# Patient Record
Sex: MALE | Race: OTHER | HISPANIC OR LATINO | Employment: STUDENT | URBAN - METROPOLITAN AREA
[De-identification: names, ages, dates, MRNs, and addresses within clinical notes are randomized per-mention and may not be internally consistent; named-entity substitution may affect disease eponyms.]

---

## 2023-06-21 ENCOUNTER — TELEPHONE (OUTPATIENT)
Dept: BARIATRICS | Facility: CLINIC | Age: 28
End: 2023-06-21

## 2024-08-23 ENCOUNTER — OFFICE VISIT (OUTPATIENT)
Dept: FAMILY MEDICINE CLINIC | Facility: CLINIC | Age: 29
End: 2024-08-23
Payer: COMMERCIAL

## 2024-08-23 ENCOUNTER — TELEPHONE (OUTPATIENT)
Dept: FAMILY MEDICINE CLINIC | Facility: CLINIC | Age: 29
End: 2024-08-23

## 2024-08-23 VITALS
HEART RATE: 88 BPM | DIASTOLIC BLOOD PRESSURE: 88 MMHG | WEIGHT: 256 LBS | OXYGEN SATURATION: 98 % | SYSTOLIC BLOOD PRESSURE: 126 MMHG | TEMPERATURE: 97.8 F | BODY MASS INDEX: 35.84 KG/M2 | RESPIRATION RATE: 18 BRPM | HEIGHT: 71 IN

## 2024-08-23 DIAGNOSIS — Z00.00 ROUTINE ADULT HEALTH MAINTENANCE: ICD-10-CM

## 2024-08-23 DIAGNOSIS — R73.01 IFG (IMPAIRED FASTING GLUCOSE): ICD-10-CM

## 2024-08-23 DIAGNOSIS — E66.01 SEVERE OBESITY (BMI 35.0-39.9) WITH COMORBIDITY (HCC): Primary | ICD-10-CM

## 2024-08-23 PROCEDURE — 99395 PREV VISIT EST AGE 18-39: CPT | Performed by: FAMILY MEDICINE

## 2024-08-23 PROCEDURE — 99203 OFFICE O/P NEW LOW 30 MIN: CPT | Performed by: FAMILY MEDICINE

## 2024-08-23 NOTE — TELEPHONE ENCOUNTER
Patient states at his visit he was informed to call his insurance to see what medication they will cover. He states that he called his insurance and they will cover Wegovy.

## 2024-08-26 ENCOUNTER — TELEPHONE (OUTPATIENT)
Dept: FAMILY MEDICINE CLINIC | Facility: CLINIC | Age: 29
End: 2024-08-26

## 2024-08-26 NOTE — TELEPHONE ENCOUNTER
Pt wife wants to know if think the Skin Tag removal that is scheduled for 08/28/24 will be medically necessary under these guidelines.     Biopsy suggests or is indicative of pre-malignancy (e.g., dysplasia) or malignancy; or  Due to its anatomic location, the lesion has been subject to recurrent trauma/irritation (eg, bra line, waist band, etc.); or  Lesion appears to be pre-malignant (e.g., actinic keratoses (see CPB 0567 - Actinic Keratoses Treatment), Bowen's disease, dysplastic lesions, dysplastic nevus syndrome, large congenital melanocytic nevi, lentigo maligna, or leukoplakia) or malignantFootnote1* (due to coloration, change in appearance or size, etc. (see note below) especially in a person with personal or family history of melanoma); or  Skin lesions are causing symptoms (e.g., bleeding, burning, intense itching, or irritation); or  The lesion has evidence of inflammation (e.g., edema, erythema, or purulence); or  The lesion is infectious (e.g., warts (verruca vulgaris)); or  The lesion restricts vision or obstructs a body orifice.    The spouse of the patient was advise if it doesn't meet the critera it is cosmetic. But we also advise coding would be done once pathology is back from the specimen.     Please advise if think it meets the guidelines of medically necessary.

## 2024-08-26 NOTE — PROGRESS NOTES
Assessment/Plan:    28 y/o male with: severe obesity with comorbidity and IFG along with annual well visit. Will check labs. Discussed supportive care and return parameters. Encourage healthy lifestyle changes.    Regarding Annual well visit. Discussed various safety and health maintenance issues including healthy diet like the Mediterranean diet, exercise, ample sleep, stress reduction, and healthy weight as tolerated. Discussed supportive care and return parameters.     No problem-specific Assessment & Plan notes found for this encounter.       Diagnoses and all orders for this visit:    Severe obesity (BMI 35.0-39.9) with comorbidity (HCC)  -     CBC and differential; Future  -     Comprehensive metabolic panel; Future  -     TSH, 3rd generation with Free T4 reflex; Future  -     Lipid Panel with Direct LDL reflex; Future  -     Hemoglobin A1C; Future    Routine adult health maintenance  -     CBC and differential; Future  -     Comprehensive metabolic panel; Future  -     TSH, 3rd generation with Free T4 reflex; Future  -     Lipid Panel with Direct LDL reflex; Future  -     Hemoglobin A1C; Future    IFG (impaired fasting glucose)  -     CBC and differential; Future  -     Comprehensive metabolic panel; Future  -     TSH, 3rd generation with Free T4 reflex; Future  -     Lipid Panel with Direct LDL reflex; Future  -     Hemoglobin A1C; Future          Subjective:     Chief Complaint   Patient presents with    Establish Care     New patient is here to establish care. Pt is experiencing thickening of the left great toe nail. Pt complaining of a bump on the back of head. Would like to discuss weight gain. No other concerns.   LR    Nail Problem    Mass    Weight Gain        Patient ID: Jason Arriaza is a 29 y.o. male.    Patient is a 28 y/o male who presents to establish care in this practice. Pt has severe obesity with comorbidity and IFG. No fevers chills nausea or vomiting. Pt also here for annual well visit admits  "being active eats and sleeps well.        The following portions of the patient's history were reviewed and updated as appropriate: allergies, current medications, past family history, past medical history, past social history, past surgical history and problem list.    Review of Systems   Constitutional: Negative.    HENT: Negative.     Eyes: Negative.    Respiratory: Negative.     Cardiovascular: Negative.    Gastrointestinal: Negative.    Endocrine: Negative.    Genitourinary: Negative.    Musculoskeletal: Negative.    Allergic/Immunologic: Negative.    Neurological: Negative.    Hematological: Negative.    Psychiatric/Behavioral: Negative.     All other systems reviewed and are negative.        Objective:      /88 (BP Location: Left arm, Patient Position: Sitting, Cuff Size: Adult)   Pulse 88   Temp 97.8 °F (36.6 °C) (Tympanic)   Resp 18   Ht 5' 11\" (1.803 m)   Wt 116 kg (256 lb)   SpO2 98%   BMI 35.70 kg/m²          Physical Exam  Constitutional:       Appearance: He is well-developed.   HENT:      Head: Atraumatic.      Right Ear: External ear normal.      Left Ear: External ear normal.   Eyes:      Extraocular Movements: EOM normal.      Conjunctiva/sclera: Conjunctivae normal.      Pupils: Pupils are equal, round, and reactive to light.   Cardiovascular:      Rate and Rhythm: Normal rate and regular rhythm.      Heart sounds: Normal heart sounds.   Pulmonary:      Effort: Pulmonary effort is normal. No respiratory distress.      Breath sounds: Normal breath sounds.   Abdominal:      General: There is no distension.      Palpations: Abdomen is soft.      Tenderness: There is no abdominal tenderness. There is no guarding or rebound.   Musculoskeletal:         General: Normal range of motion.      Cervical back: Normal range of motion.   Skin:     General: Skin is warm and dry.   Neurological:      Mental Status: He is alert and oriented to person, place, and time.      Cranial Nerves: No cranial " nerve deficit.   Psychiatric:         Mood and Affect: Mood and affect normal.         Behavior: Behavior normal.         Thought Content: Thought content normal.         Judgment: Judgment normal.

## 2024-08-27 NOTE — TELEPHONE ENCOUNTER
We cannot know if insurance will pay for it, she is welcome to check with them, worse come to worse she would pay cash for it

## 2024-08-27 NOTE — TELEPHONE ENCOUNTER
Left message for patient spouse to advise of  response. I advise to call office if they have any further questions or concerns.

## 2024-09-03 ENCOUNTER — TELEPHONE (OUTPATIENT)
Age: 29
End: 2024-09-03

## 2024-09-03 NOTE — TELEPHONE ENCOUNTER
Dano states all the local [facility]  the only pharmacy insurance will cover so send the script there to fill when it is not backordered.

## 2024-09-22 PROBLEM — Z00.00 ROUTINE ADULT HEALTH MAINTENANCE: Status: RESOLVED | Noted: 2024-08-23 | Resolved: 2024-09-22

## 2025-01-02 ENCOUNTER — TELEPHONE (OUTPATIENT)
Age: 30
End: 2025-01-02

## 2025-01-02 NOTE — TELEPHONE ENCOUNTER
PA for Wegovy 0.25MG/0.5ML SUBMITTED to [facility]    via    []CMM-KEY:   [x]Surescripts-Case ID # [id number]   []Availity-Auth ID # NDC #   []Faxed to plan   []Other website   []Phone call Case ID #     [x]PA sent as URGENT    All office notes, labs and other pertaining documents and studies sent. Clinical questions answered. Awaiting determination from insurance company.     Turnaround time for your insurance to make a decision on your Prior Authorization can take 7-21 business days.

## 2025-01-02 NOTE — TELEPHONE ENCOUNTER
Reason for call:   [x] Prior Auth  [] Other:     Caller:  [x] Patient  [] Pharmacy  Name:   Address:   Callback Number:     Medication:   Semaglutide-Weight Management (Wegovy) 0.25 MG/0.5ML     Dose/Frequency:  Inject 0.25 mg under the skin weekly     Quantity: 2 ml    Ordering Provider:   [] PCP/Provider -   [] Speciality/Provider -     Has the patient tried other medications and failed? If failed, which medications did they fail?    [] No   [] Yes -     Is the patient's insurance updated in EPIC?   [x] Yes   [] No     Is a copy of the patient's insurance scanned in EPIC?   [x] Yes   [] No

## 2025-01-02 NOTE — TELEPHONE ENCOUNTER
PA for Wegovy 0.25MG/0.5ML APPROVED     Date(s) approved: 01/02/2025-08/02/2025    Case #[id number]     Patient advised by          [x]MyChart Message  []Phone call   []LMOM  []L/M to call office as no active Communication consent on file  [x]Unable to leave detailed message as VM not approved on Communication consent       Pharmacy advised by    [x]Fax  []Phone call    Approval letter scanned into Media Yes

## 2025-01-14 DIAGNOSIS — E66.01 SEVERE OBESITY (BMI 35.0-39.9) WITH COMORBIDITY (HCC): ICD-10-CM

## 2025-01-14 DIAGNOSIS — R73.01 IFG (IMPAIRED FASTING GLUCOSE): ICD-10-CM

## 2025-01-14 RX ORDER — SEMAGLUTIDE 0.25 MG/.5ML
INJECTION, SOLUTION SUBCUTANEOUS
Qty: 2 ML | Refills: 0 | OUTPATIENT
Start: 2025-01-14

## 2025-01-14 NOTE — TELEPHONE ENCOUNTER
Pt wife, Tonya called.  Her  is doing well on the medication, Wegov 0.25mg. Is ready to increase to the next dose.  Please send message via MY CHART once medication is sent to to [facility].    Medication:     Semaglutide-Weight Management (Wegovy)       Dose/Frequency: ?    Quantity: ?    Pharmacy: [facility] pharmacy, [address] [place] [address]    Office:   [x] PCP/Provider - Giovany Judge MD   [] Speciality/Provider -     Does the patient have enough for 3 days?   [] Yes   [x] No - Send as HP to POD

## 2025-01-15 DIAGNOSIS — E66.01 SEVERE OBESITY (BMI 35.0-39.9) WITH COMORBIDITY (HCC): Primary | ICD-10-CM

## 2025-01-15 DIAGNOSIS — R73.01 IFG (IMPAIRED FASTING GLUCOSE): ICD-10-CM

## 2025-01-15 DIAGNOSIS — E66.01 SEVERE OBESITY (BMI 35.0-39.9) WITH COMORBIDITY (HCC): ICD-10-CM

## 2025-01-15 RX ORDER — SEMAGLUTIDE 0.5 MG/.5ML
INJECTION, SOLUTION SUBCUTANEOUS
Qty: 2 ML | Refills: 0 | Status: SHIPPED | OUTPATIENT
Start: 2025-01-15

## 2025-01-15 RX ORDER — SEMAGLUTIDE 0.25 MG/.5ML
INJECTION, SOLUTION SUBCUTANEOUS
Qty: 2 ML | Refills: 0 | Status: CANCELLED | OUTPATIENT
Start: 2025-01-15

## 2025-01-15 NOTE — TELEPHONE ENCOUNTER
Pt wife called in stating pharmacy has not received the new prescription for Wegovy yet requested to resend and keep her informed by an return call so she can get the medication today. Kindly help. Thanks

## 2025-01-15 NOTE — TELEPHONE ENCOUNTER
Pts wife called again today for the wegovy prescription,called office and Jillian said she will take care of it. fyi

## 2025-02-07 ENCOUNTER — TELEPHONE (OUTPATIENT)
Age: 30
End: 2025-02-07

## 2025-02-07 DIAGNOSIS — E66.01 SEVERE OBESITY (BMI 35.0-39.9) WITH COMORBIDITY (HCC): Primary | ICD-10-CM

## 2025-02-07 DIAGNOSIS — R73.01 IFG (IMPAIRED FASTING GLUCOSE): ICD-10-CM

## 2025-02-07 RX ORDER — SEMAGLUTIDE 1 MG/.5ML
INJECTION, SOLUTION SUBCUTANEOUS
Qty: 2 ML | Refills: 0 | Status: SHIPPED | OUTPATIENT
Start: 2025-02-07

## 2025-02-07 NOTE — TELEPHONE ENCOUNTER
Please review and advise or send next step to [facility] [place] [address]

## 2025-02-07 NOTE — TELEPHONE ENCOUNTER
Colds are due to viral infections and are very common. Sore throat is a prominent, and often the first, symptom. The cough that accompanies most colds is annoying but helps physiologically to protect the lungs and clear them of secretions.  Antibiotics are Patient spouse called to report that patient is doing really well on Wegovy. He has had no issues and is ready to go to next dosage of 1 MG.    Medication: Semaglutide-Weight Management (Wegovy)     Dose/Frequency: ?    Quantity: ?    Pharmacy: [id number] - [place], [place] - [address]     Office:   [x] PCP/Provider - Giovany Judge MD   [] Speciality/Provider -     Does the patient have enough for 3 days?   [x] Yes   [] No - Send as HP to POD     present. · Steamy showers before bed may help lessen the cough reflex  · Honey has been shown to be the most helpful cough suppressant - better than OTC cough medications like Delsym.  OTC cough medications can contain many different ingredients and are be

## 2025-03-18 DIAGNOSIS — E66.01 SEVERE OBESITY (BMI 35.0-39.9) WITH COMORBIDITY (HCC): ICD-10-CM

## 2025-03-18 DIAGNOSIS — R73.01 IFG (IMPAIRED FASTING GLUCOSE): ICD-10-CM

## 2025-03-18 RX ORDER — SEMAGLUTIDE 1 MG/.5ML
INJECTION, SOLUTION SUBCUTANEOUS
Qty: 2 ML | Refills: 0 | Status: SHIPPED | OUTPATIENT
Start: 2025-03-18

## 2025-03-18 NOTE — TELEPHONE ENCOUNTER
How are you tolerating the medication?   [] Nausea  [] Vomiting  [] Diarrhea  [x] Asymptomatic  [] Other:    Last visit weight: 256 lbs    Current weight: 245 lbs     Date of last injection: 03/15/25    How many injections do you have left: 1  Current dose Semaglutide-Weight Management (Wegovy) 1 MG/0.5ML  Patient would like to to stay on the current dose   [id number] - [place], [place] - [address]

## 2025-04-14 ENCOUNTER — TELEPHONE (OUTPATIENT)
Age: 30
End: 2025-04-14

## 2025-04-14 DIAGNOSIS — E66.01 SEVERE OBESITY (BMI 35.0-39.9) WITH COMORBIDITY (HCC): Primary | ICD-10-CM

## 2025-04-14 DIAGNOSIS — R73.01 IFG (IMPAIRED FASTING GLUCOSE): ICD-10-CM

## 2025-04-14 NOTE — TELEPHONE ENCOUNTER
Last Visit 08/23/2024  Next appt 08/27/2025    Tonya, the patient's spouse stated that the patient is requesting a refill for Wegovy at the higher dosage of 1.7 mg at the [facility] pharmacy in [facility]. Please advise.

## 2025-04-15 RX ORDER — SEMAGLUTIDE 1.7 MG/.75ML
INJECTION, SOLUTION SUBCUTANEOUS
Qty: 3 ML | Refills: 0 | Status: SHIPPED | OUTPATIENT
Start: 2025-04-15

## 2025-04-27 ENCOUNTER — HOSPITAL ENCOUNTER (EMERGENCY)
Facility: HOSPITAL | Age: 30
Discharge: HOME | End: 2025-04-27
Attending: EMERGENCY MEDICINE
Payer: COMMERCIAL

## 2025-04-27 VITALS
DIASTOLIC BLOOD PRESSURE: 94 MMHG | SYSTOLIC BLOOD PRESSURE: 137 MMHG | HEART RATE: 101 BPM | TEMPERATURE: 98.5 F | RESPIRATION RATE: 18 BRPM | BODY MASS INDEX: 35.05 KG/M2 | WEIGHT: 251.32 LBS | OXYGEN SATURATION: 98 %

## 2025-04-27 DIAGNOSIS — J03.00 ACUTE STREPTOCOCCAL TONSILLITIS: Primary | ICD-10-CM

## 2025-04-27 LAB
ALBUMIN SERPL BCG-MCNC: 4.2 G/DL (ref 3.5–5)
ALP SERPL-CCNC: 77 U/L (ref 34–104)
ALT SERPL W P-5'-P-CCNC: 29 U/L (ref 7–52)
ANION GAP SERPL CALCULATED.3IONS-SCNC: 6 MMOL/L (ref 4–13)
AST SERPL W P-5'-P-CCNC: 14 U/L (ref 13–39)
BASOPHILS # BLD AUTO: 0.07 THOUSANDS/ÂΜL (ref 0–0.1)
BASOPHILS NFR BLD AUTO: 0 % (ref 0–1)
BILIRUB SERPL-MCNC: 1.49 MG/DL (ref 0.2–1)
BUN SERPL-MCNC: 10 MG/DL (ref 5–25)
CALCIUM SERPL-MCNC: 9.2 MG/DL (ref 8.4–10.2)
CHLORIDE SERPL-SCNC: 103 MMOL/L (ref 96–108)
CO2 SERPL-SCNC: 26 MMOL/L (ref 21–32)
CREAT SERPL-MCNC: 0.8 MG/DL (ref 0.6–1.3)
EOSINOPHIL # BLD AUTO: 0.14 THOUSAND/ÂΜL (ref 0–0.61)
EOSINOPHIL NFR BLD AUTO: 1 % (ref 0–6)
ERYTHROCYTE [DISTWIDTH] IN BLOOD BY AUTOMATED COUNT: 12.6 % (ref 11.6–15.1)
GFR SERPL CREATININE-BSD FRML MDRD: 119 ML/MIN/1.73SQ M
GLUCOSE SERPL-MCNC: 110 MG/DL (ref 65–140)
HCT VFR BLD AUTO: 48.3 % (ref 36.5–49.3)
HGB BLD-MCNC: 16.5 G/DL (ref 12–17)
IMM GRANULOCYTES # BLD AUTO: 0.12 THOUSAND/UL (ref 0–0.2)
IMM GRANULOCYTES NFR BLD AUTO: 1 % (ref 0–2)
LYMPHOCYTES # BLD AUTO: 2.1 THOUSANDS/ÂΜL (ref 0.6–4.47)
LYMPHOCYTES NFR BLD AUTO: 11 % (ref 14–44)
MCH RBC QN AUTO: 29.8 PG (ref 26.8–34.3)
MCHC RBC AUTO-ENTMCNC: 34.2 G/DL (ref 31.4–37.4)
MCV RBC AUTO: 87 FL (ref 82–98)
MONOCYTES # BLD AUTO: 1.82 THOUSAND/ÂΜL (ref 0.17–1.22)
MONOCYTES NFR BLD AUTO: 10 % (ref 4–12)
NEUTROPHILS # BLD AUTO: 14.3 THOUSANDS/ÂΜL (ref 1.85–7.62)
NEUTS SEG NFR BLD AUTO: 77 % (ref 43–75)
NRBC BLD AUTO-RTO: 0 /100 WBCS
PLATELET # BLD AUTO: 256 THOUSANDS/UL (ref 149–390)
PMV BLD AUTO: 10.6 FL (ref 8.9–12.7)
POTASSIUM SERPL-SCNC: 3.9 MMOL/L (ref 3.5–5.3)
PROT SERPL-MCNC: 7.9 G/DL (ref 6.4–8.4)
RBC # BLD AUTO: 5.53 MILLION/UL (ref 3.88–5.62)
S PYO DNA THROAT QL NAA+PROBE: DETECTED
SODIUM SERPL-SCNC: 135 MMOL/L (ref 135–147)
WBC # BLD AUTO: 18.55 THOUSAND/UL (ref 4.31–10.16)

## 2025-04-27 PROCEDURE — 87651 STREP A DNA AMP PROBE: CPT | Performed by: PHYSICIAN ASSISTANT

## 2025-04-27 PROCEDURE — 86308 HETEROPHILE ANTIBODY SCREEN: CPT | Performed by: PHYSICIAN ASSISTANT

## 2025-04-27 PROCEDURE — 96361 HYDRATE IV INFUSION ADD-ON: CPT

## 2025-04-27 PROCEDURE — 36415 COLL VENOUS BLD VENIPUNCTURE: CPT | Performed by: PHYSICIAN ASSISTANT

## 2025-04-27 PROCEDURE — 85025 COMPLETE CBC W/AUTO DIFF WBC: CPT | Performed by: PHYSICIAN ASSISTANT

## 2025-04-27 PROCEDURE — 99284 EMERGENCY DEPT VISIT MOD MDM: CPT | Performed by: PHYSICIAN ASSISTANT

## 2025-04-27 PROCEDURE — 80053 COMPREHEN METABOLIC PANEL: CPT | Performed by: PHYSICIAN ASSISTANT

## 2025-04-27 PROCEDURE — 99283 EMERGENCY DEPT VISIT LOW MDM: CPT

## 2025-04-27 PROCEDURE — 96374 THER/PROPH/DIAG INJ IV PUSH: CPT

## 2025-04-27 RX ORDER — IBUPROFEN 600 MG/1
600 TABLET, FILM COATED ORAL EVERY 6 HOURS PRN
Qty: 30 TABLET | Refills: 0 | Status: SHIPPED | OUTPATIENT
Start: 2025-04-27 | End: 2025-05-07

## 2025-04-27 RX ORDER — KETOROLAC TROMETHAMINE 30 MG/ML
15 INJECTION, SOLUTION INTRAMUSCULAR; INTRAVENOUS ONCE
Status: COMPLETED | OUTPATIENT
Start: 2025-04-27 | End: 2025-04-27

## 2025-04-27 RX ADMIN — KETOROLAC TROMETHAMINE 15 MG: 30 INJECTION, SOLUTION INTRAMUSCULAR; INTRAVENOUS at 16:05

## 2025-04-27 RX ADMIN — SODIUM CHLORIDE 1000 ML: 0.9 INJECTION, SOLUTION INTRAVENOUS at 16:05

## 2025-04-27 RX ADMIN — AMOXICILLIN AND CLAVULANATE POTASSIUM 1 TABLET: 875; 125 TABLET, COATED ORAL at 16:44

## 2025-04-27 RX ADMIN — DEXAMETHASONE SODIUM PHOSPHATE 10 MG: 10 INJECTION, SOLUTION INTRAMUSCULAR; INTRAVENOUS at 16:44

## 2025-04-27 NOTE — ED PROVIDER NOTES
Time reflects when diagnosis was documented in both MDM as applicable and the Disposition within this note       Time User Action Codes Description Comment    4/27/2025  5:04 PM Tonya Chirinos Add [J03.00] Acute streptococcal tonsillitis           ED Disposition       ED Disposition   Discharge    Condition   Stable    Date/Time   Sun Apr 27, 2025  5:04 PM    Comment   Jason Arriaza discharge to home/self care.                   Assessment & Plan       Medical Decision Making  No sign of peritonsillar abscess will test for strep and mono will give IV hydration and get basic labs for electrolytes white count anemia and LFT abnormality.    Amount and/or Complexity of Data Reviewed  Labs: ordered. Decision-making details documented in ED Course.    Risk  Prescription drug management.        ED Course as of 04/27/25 1706   Sun Apr 27, 2025   1619 WBC(!): 18.55   1641 STREP A PCR(!): Detected  + strep   1704 tolleratingPO- instructions reviewed        Medications   sodium chloride 0.9 % bolus 1,000 mL (1,000 mL Intravenous New Bag 4/27/25 1605)   ketorolac (TORADOL) injection 15 mg (15 mg Intravenous Given 4/27/25 1605)   dexamethasone oral liquid 10 mg 1 mL (10 mg Oral Given 4/27/25 1644)   amoxicillin-clavulanate (AUGMENTIN) 875-125 mg per tablet 1 tablet (1 tablet Oral Given 4/27/25 1644)       ED Risk Strat Scores                    No data recorded        SBIRT 20yo+      Flowsheet Row Most Recent Value   Initial Alcohol Screen: US AUDIT-C     1. How often do you have a drink containing alcohol? 1 Filed at: 04/27/2025 1641   2. How many drinks containing alcohol do you have on a typical day you are drinking?  0 Filed at: 04/27/2025 1641   3a. Male UNDER 65: How often do you have five or more drinks on one occasion? 0 Filed at: 04/27/2025 1641   3b. FEMALE Any Age, or MALE 65+: How often do you have 4 or more drinks on one occassion? 0 Filed at: 04/27/2025 1641   Audit-C Score 1 Filed at: 04/27/2025 1641   THAO: How  many times in the past year have you...    Used an illegal drug or used a prescription medication for non-medical reasons? Never Filed at: 04/27/2025 1641                            History of Present Illness       Chief Complaint   Patient presents with    Sore Throat     Pt states that for the last three days that he has had subjective fever and sore throat, states that he is not able to swallow saliva and that he hasn't really been able to eat or drink.        History reviewed. No pertinent past medical history.   History reviewed. No pertinent surgical history.   Family History   Problem Relation Age of Onset    Diabetes Mother       Social History     Tobacco Use    Smoking status: Never     Passive exposure: Never    Smokeless tobacco: Never   Vaping Use    Vaping status: Never Used   Substance Use Topics    Alcohol use: Not Currently    Drug use: Never      E-Cigarette/Vaping    E-Cigarette Use Never User       E-Cigarette/Vaping Substances    Nicotine No     THC No     CBD No     Flavoring No     Other No     Unknown No       I have reviewed and agree with the history as documented.     Fever and sore throat x 3 days and HA. No cough.  Patient is able to swallow but it hurts.  No nausea or vomiting.        Review of Systems   Constitutional:  Positive for fever.   HENT:  Positive for sore throat.    Respiratory: Negative.     Cardiovascular: Negative.    Gastrointestinal: Negative.    All other systems reviewed and are negative.          Objective       ED Triage Vitals [04/27/25 1535]   Temperature Pulse Blood Pressure Respirations SpO2 Patient Position - Orthostatic VS   98.5 °F (36.9 °C) (!) 116 (!) 133/102 20 97 % --      Temp Source Heart Rate Source BP Location FiO2 (%) Pain Score    Oral Monitor -- -- 9      Vitals      Date and Time Temp Pulse SpO2 Resp BP Pain Score FACES Pain Rating User   04/27/25 1605 -- -- -- -- -- 6 -- SL   04/27/25 1535 98.5 °F (36.9 °C) 116 97 % 20 133/102 9 -- AND             Physical Exam  Vitals and nursing note reviewed.   Constitutional:       Appearance: He is well-developed.   HENT:      Head: Normocephalic and atraumatic.      Right Ear: Tympanic membrane, ear canal and external ear normal.      Left Ear: Tympanic membrane, ear canal and external ear normal.      Mouth/Throat:      Tonsils: No tonsillar abscesses. 4+ on the right. 4+ on the left.      Comments: There is no trismus or peritonsillar abscess +bilateral tonsillar hypertrophy  Eyes:      Conjunctiva/sclera: Conjunctivae normal.   Neck:      Comments: Anterior cervical nodes   Cardiovascular:      Rate and Rhythm: Normal rate and regular rhythm.      Heart sounds: Normal heart sounds.   Pulmonary:      Effort: Pulmonary effort is normal.      Breath sounds: Normal breath sounds.   Abdominal:      General: Bowel sounds are normal.      Palpations: Abdomen is soft.   Musculoskeletal:         General: Normal range of motion.      Cervical back: Normal range of motion and neck supple.   Lymphadenopathy:      Cervical: Cervical adenopathy present.   Skin:     General: Skin is warm.      Findings: No rash.   Neurological:      Mental Status: He is alert and oriented to person, place, and time.      Motor: No abnormal muscle tone.      Coordination: Coordination normal.   Psychiatric:         Mood and Affect: Mood normal.         Behavior: Behavior normal.         Results Reviewed       Procedure Component Value Units Date/Time    Strep A PCR [[id number]]  (Abnormal) Collected: 04/27/25 1607    Lab Status: Final result Specimen: Throat Updated: 04/27/25 1636     STREP A PCR Detected    Comprehensive metabolic panel [[id number]]  (Abnormal) Collected: 04/27/25 1607    Lab Status: Final result Specimen: Blood from Arm, Right Updated: 04/27/25 1630     Sodium 135 mmol/L      Potassium 3.9 mmol/L      Chloride 103 mmol/L      CO2 26 mmol/L      ANION GAP 6 mmol/L      BUN 10 mg/dL      Creatinine 0.80 mg/dL      Glucose 110  mg/dL      Calcium 9.2 mg/dL      AST 14 U/L      ALT 29 U/L      Alkaline Phosphatase 77 U/L      Total Protein 7.9 g/dL      Albumin 4.2 g/dL      Total Bilirubin 1.49 mg/dL      eGFR 119 ml/min/1.73sq m     Narrative:      National Kidney Disease Foundation guidelines for Chronic Kidney Disease (CKD):     Stage 1 with normal or high GFR (GFR > 90 mL/min/1.73 square meters)    Stage 2 Mild CKD (GFR = 60-89 mL/min/1.73 square meters)    Stage 3A Moderate CKD (GFR = 45-59 mL/min/1.73 square meters)    Stage 3B Moderate CKD (GFR = 30-44 mL/min/1.73 square meters)    Stage 4 Severe CKD (GFR = 15-29 mL/min/1.73 square meters)    Stage 5 End Stage CKD (GFR <15 mL/min/1.73 square meters)  Note: GFR calculation is accurate only with a steady state creatinine    Mononucleosis screen [[id number]] Collected: 04/27/25 1607    Lab Status: In process Specimen: Blood from Arm, Right Updated: 04/27/25 1619    CBC and differential [[id number]]  (Abnormal) Collected: 04/27/25 1607    Lab Status: Final result Specimen: Blood from Arm, Right Updated: 04/27/25 1614     WBC 18.55 Thousand/uL      RBC 5.53 Million/uL      Hemoglobin 16.5 g/dL      Hematocrit 48.3 %      MCV 87 fL      MCH 29.8 pg      MCHC 34.2 g/dL      RDW 12.6 %      MPV 10.6 fL      Platelets 256 Thousands/uL      nRBC 0 /100 WBCs      Segmented % 77 %      Immature Grans % 1 %      Lymphocytes % 11 %      Monocytes % 10 %      Eosinophils Relative 1 %      Basophils Relative 0 %      Absolute Neutrophils 14.30 Thousands/µL      Absolute Immature Grans 0.12 Thousand/uL      Absolute Lymphocytes 2.10 Thousands/µL      Absolute Monocytes 1.82 Thousand/µL      Eosinophils Absolute 0.14 Thousand/µL      Basophils Absolute 0.07 Thousands/µL             No orders to display       Procedures    ED Medication and Procedure Management   Prior to Admission Medications   Prescriptions Last Dose Informant Patient Reported? Taking?   Semaglutide-Weight Management (Wegovy) 1.7  MG/0.75ML   No No   Sig: Inject 1.7 mg under the skin weekly      Facility-Administered Medications: None     Patient's Medications   Discharge Prescriptions    AMOXICILLIN-CLAVULANATE (AUGMENTIN) 875-125 MG PER TABLET    Take 1 tablet by mouth every 12 (twelve) hours for 7 days       Start Date: 4/27/2025 End Date: 5/4/2025       Order Dose: 1 tablet       Quantity: 14 tablet    Refills: 0    IBUPROFEN (MOTRIN) 600 MG TABLET    Take 1 tablet (600 mg total) by mouth every 6 (six) hours as needed for mild pain for up to 10 days       Start Date: 4/27/2025 End Date: 5/7/2025       Order Dose: 600 mg       Quantity: 30 tablet    Refills: 0     No discharge procedures on file.  ED SEPSIS DOCUMENTATION   Time reflects when diagnosis was documented in both MDM as applicable and the Disposition within this note       Time User Action Codes Description Comment    4/27/2025  5:04 PM Tonya Chirinos [J03.00] Acute streptococcal tonsillitis                  Tonya Chirinos PA-C  04/27/25 1706

## 2025-04-27 NOTE — DISCHARGE INSTRUCTIONS
Augmentin as directed.  You got a dose of steroid here will help with swelling for several days.  Tylenol or Motrin for fevers/pain. Saline spray for congestion you may use Mucinex for cough and congestion increase, fluids follow-up with the family doctor. Return to the emergency department for worsening symptoms.

## 2025-04-27 NOTE — Clinical Note
Jason Arriaza was seen and treated in our emergency department on 4/27/2025.                Diagnosis:     Jason  .    He may return on this date: 04/29/2025         If you have any questions or concerns, please don't hesitate to call.      Tonya Chirinos PA-C    ______________________________           _______________          _______________  Hospital Representative                              Date                                Time

## 2025-04-28 LAB — HETEROPH AB SER QL: NEGATIVE

## 2025-05-09 ENCOUNTER — TELEPHONE (OUTPATIENT)
Age: 30
End: 2025-05-09

## 2025-05-09 DIAGNOSIS — E66.01 SEVERE OBESITY (BMI 35.0-39.9) WITH COMORBIDITY (HCC): Primary | ICD-10-CM

## 2025-05-09 DIAGNOSIS — R73.01 IFG (IMPAIRED FASTING GLUCOSE): ICD-10-CM

## 2025-05-09 NOTE — TELEPHONE ENCOUNTER
Patient's wife called the RX Refill Line. Message is being forwarded to the office.     Patient is requesting a refill on Wegovy. He would like to increase to the next dose and is asking for a 3 month supply. [facility] [place] [place] [facility] [address]     Please contact patient at [phone]

## 2025-05-13 RX ORDER — SEMAGLUTIDE 2.4 MG/.75ML
INJECTION, SOLUTION SUBCUTANEOUS
Qty: 9 ML | Refills: 1 | Status: SHIPPED | OUTPATIENT
Start: 2025-05-13

## 2025-05-14 ENCOUNTER — TELEPHONE (OUTPATIENT)
Age: 30
End: 2025-05-14

## 2025-05-14 NOTE — TELEPHONE ENCOUNTER
Patients wife call pharmacy filled wegovy 2.4 mg for 3 ml , patient wanted 3 month supply,  sent for 9 mls on 05/13/25 @820 am. Will call pharmacy back let them know  did send for 9 ml